# Patient Record
(demographics unavailable — no encounter records)

---

## 2025-02-19 NOTE — PLAN
[FreeTextEntry1] : Rx for Augmentin provided - take twice daily Return to office in 24 hours if pain/redness does not improve.  If symptoms do improve, recommend return to office in 2 weeks for reevaluation.

## 2025-02-19 NOTE — HISTORY OF PRESENT ILLNESS
[de-identified] : Mr. BALDERAS is a 39 year old man with left buttock cellulitis / abscess, referred from [facility] for evaluation. He reports minimal improvement on antibiotics (keflex). Reports pain and redness at site. Denies fever/chills/nausea/emesis or changes in bowel or bladder habits. Tolerating diet. Normal bowel movements.

## 2025-02-19 NOTE — PHYSICAL EXAM
[JVD] : no jugular venous distention  [Alert] : alert [Oriented to Person] : oriented to person [Oriented to Place] : oriented to place [Oriented to Time] : oriented to time [Calm] : calm [de-identified] : No acute distress [de-identified] : No respiratory distress [de-identified] : Regular rate [de-identified] : soft, nontender. no rebound or guarding. [de-identified] : normal range of motion [de-identified] : left buttock with 4x3cm region of erythema and induration. no appreciable fluctuance

## 2025-02-19 NOTE — ASSESSMENT
[FreeTextEntry1] : Mr. BALDERAS is a 39 year old man with left buttock cellulitis / possible developing abscess. I recommend Augmentin BID and I recommend close followup in 1 day if he fails to experience improvement. If pain/redness does not improve, we will plan for incision and drainage tomorrow. Mr Balderas is agreeabl to this plan.